# Patient Record
Sex: FEMALE | Race: WHITE | NOT HISPANIC OR LATINO | Employment: OTHER | URBAN - METROPOLITAN AREA
[De-identification: names, ages, dates, MRNs, and addresses within clinical notes are randomized per-mention and may not be internally consistent; named-entity substitution may affect disease eponyms.]

---

## 2017-04-03 ENCOUNTER — APPOINTMENT (OUTPATIENT)
Dept: LAB | Facility: HOSPITAL | Age: 57
End: 2017-04-03
Attending: OBSTETRICS & GYNECOLOGY
Payer: MEDICARE

## 2017-04-03 DIAGNOSIS — R10.9 ABDOMINAL PAIN: ICD-10-CM

## 2017-04-03 DIAGNOSIS — C55 MALIGNANT NEOPLASM OF UTERUS (HCC): ICD-10-CM

## 2017-04-03 DIAGNOSIS — C56.9 MALIGNANT NEOPLASM OF OVARY (HCC): ICD-10-CM

## 2017-04-03 DIAGNOSIS — R31.0 GROSS HEMATURIA: ICD-10-CM

## 2017-04-03 PROCEDURE — 86304 IMMUNOASSAY TUMOR CA 125: CPT

## 2017-04-03 PROCEDURE — 36415 COLL VENOUS BLD VENIPUNCTURE: CPT

## 2017-04-04 LAB — CANCER AG125 SERPL-ACNC: 5.7 U/ML (ref 0–30)

## 2017-04-25 ENCOUNTER — ALLSCRIPTS OFFICE VISIT (OUTPATIENT)
Dept: OTHER | Facility: OTHER | Age: 57
End: 2017-04-25

## 2017-04-25 DIAGNOSIS — N20.0 CALCULUS OF KIDNEY: ICD-10-CM

## 2017-04-25 DIAGNOSIS — C56.9 MALIGNANT NEOPLASM OF OVARY (HCC): ICD-10-CM

## 2017-04-25 DIAGNOSIS — C55 MALIGNANT NEOPLASM OF UTERUS (HCC): ICD-10-CM

## 2017-04-25 DIAGNOSIS — R31.0 GROSS HEMATURIA: ICD-10-CM

## 2017-10-09 ENCOUNTER — APPOINTMENT (OUTPATIENT)
Dept: LAB | Facility: HOSPITAL | Age: 57
End: 2017-10-09
Attending: OBSTETRICS & GYNECOLOGY
Payer: MEDICARE

## 2017-10-09 DIAGNOSIS — C56.9 MALIGNANT NEOPLASM OF OVARY (HCC): ICD-10-CM

## 2017-10-09 DIAGNOSIS — N20.0 CALCULUS OF KIDNEY: ICD-10-CM

## 2017-10-09 DIAGNOSIS — C55 MALIGNANT NEOPLASM OF UTERUS (HCC): ICD-10-CM

## 2017-10-09 DIAGNOSIS — R31.0 GROSS HEMATURIA: ICD-10-CM

## 2017-10-09 PROCEDURE — 36415 COLL VENOUS BLD VENIPUNCTURE: CPT

## 2017-10-09 PROCEDURE — 86304 IMMUNOASSAY TUMOR CA 125: CPT

## 2017-10-10 LAB — CANCER AG125 SERPL-ACNC: 5.3 U/ML (ref 0–30)

## 2017-10-24 ENCOUNTER — ALLSCRIPTS OFFICE VISIT (OUTPATIENT)
Dept: OTHER | Facility: OTHER | Age: 57
End: 2017-10-24

## 2017-10-24 DIAGNOSIS — C55 MALIGNANT NEOPLASM OF UTERUS (HCC): ICD-10-CM

## 2017-10-24 DIAGNOSIS — C56.9 MALIGNANT NEOPLASM OF OVARY (HCC): ICD-10-CM

## 2017-10-25 NOTE — PROGRESS NOTES
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)   2  Ovary cancer (183 0) (C56 9)    Plan  Endometrioid adenocarcinoma of uterus, Ovary cancer    · Follow-up visit in 6 months Evaluation and Treatment  Follow-up  Status: Hold For -  Scheduling  Requested for: 24Oct2017   Ordered; For: Endometrioid adenocarcinoma of uterus, Ovary cancer; Ordered By: Eunice Gale Performed:  Due: 29Oct2017   · (1) ; Status:Active; Requested [facility]:[id number];    Perform:[facility]ly [facility]; UYL:[id number]; Ordered;For:Endometrioid adenocarcinoma of uterus, Ovary cancer; Ordered By:Kaye Berry;    Discussion/Summary  Discussion Summary:   Early ovarian and endometrial cancer: No evidence of disease  Plan to reevaluate in 6 months with a previsit CA-125  Genetic susceptibility to cancer: COLARIS and BRCA wild type  Incidental lung nodule: Stable on interval followup  Despite new tiny lesions (all < 5 mm), patient is in an exceedingly low risk of metastatic lung disease from early ovarian and endometrial cancer  We'll only followup on an as-needed basis  Incidental liver lesion: MRI of the liver favored [facility], repeat MRI December 2014 confirmed likely [address]  Abdominal pain: chronic  Per PCP and GI  Kidney stones: Per urology at [facility]  Chief Complaint  Chief Complaint Free Text Note Form: Endometrial and ovarian cancer surveillance  History of Present Illness  Problem [facility]:   #1  Dual primaries: Stage IA grade 2 (non invasive, no LVSI) endometrial cancer + stage IA grade 2 endometrioid ovarian cancer  Pathology review from [facility]  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: COLARIS negative for mutations  BRCA1/2 wild type  Incidental lung nodule: Undergoing CT surveillance  Liver lesion: Confirmed [facility]  Incisional hernia (repaired with mesh Dr Adin Alvarez 2014)  Admission for / medical therapy for diverticulitis    Kidney stones, s/p endourologic procedure, now with stent in place (  [address])  Admission to [facility] July 2015 for severe abdominal pain  Reportedly, patient was told by her primary care physician Dr Chiquis Reis that her abdominal wall mesh needed to be removed (?)  Previous Therapy:   #1  EUA, JIA, BSO, Pelvic and Periaortic LND, Omentectomy, Peritoneal Bxs, Appy on 9/20/2013  Chemotherapy with carboplatin and paclitaxel every 3 weeks  She received 6 cycles of chemotherapy, completed January 31, 2014  Free Text HPI: Here for routine surveillance  Has no gynecologic complaints  Denies pelvic or abdominal pain other than occasional twinges and colicky discomfort  Denies vaginal bleeding, drainage or discharge  Constipation is stable at baseline  CA-125 stable  Review of Systems  Complete-Female Gyn Onc:   Constitutional: No fever, no recent weight gain, no chills, not feeling tired and no recent weight loss  Gastrointestinal: abdominal pain-- and-- diarrhea, but-- as noted in HPI  Genitourinary: No complaints of dysuria, no incontinence, no pelvic pain, no dysmenorrhea, no vaginal discharge or bleeding  Active Problems  1  Abdominal pain (789 00) (R10 9)   2  Anxiety (300 00) (F41 9)   3  Arthritis (716 90) (M19 90)   4  Backache (724 5) (M54 9)   5  Breast cancer screening, high risk patient (V76 11) (Z12 31)   6  Chronic obstructive pulmonary disease (496) (J44 9)   7  Depression (311) (F32 9)   8  Diverticular disease (562 10) (K57 90)   9  Encounter for antineoplastic chemotherapy (V58 11) (Z51 11)   10  Encounter for screening mammogram for malignant neoplasm of breast (V76 12)    (Z12 31)   11  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)   12  Gross hematuria (599 71) (R31 0)   13  Kidney stones (592 0) (N20 0)   14  Ovary cancer (183 0) (C56 9)    Past Medical History  1  Arthritis (716 90) (M19 90)   2  Backache (724 5) (M54 9)   3  History of Bad odor of urine (791 9) (R82 90)   4   History of Cellulitis Of The Abdominal Wall (682 2)   5  History of Chemotherapeutics   6  Chronic obstructive pulmonary disease (496) (J44 9)   7  Depression (311) (F32 9)   8  History of Disruption Of Wound (998 30)   9  History of Genetic Susceptibility To Malignant Neoplasm Endometrium (V84 04)   10  History of cancer of uterus (V10 42) (Z85 42)   11  History of nausea and vomiting (V12 79) (Z87 898)   12  History of Hyperplastic colon polyp (211 3) (K63 5)   13  History of Incisional hernia (553 21) (K43 2)   14  History of Liver lesion (573 8) (K76 9)   15  History of Postoperative examination (V67 00) (Z09)   16  History of Pulmonary nodule seen on imaging study (793 11) (R91 1)   17  History of Status post repair of ventral hernia (V45 89) (Z98 890,Z87 19)   18  History of Visit For: Exam Following Chemotherapy (V67 2)  Active Problems And Past Medical History Reviewed: The active problems and past medical history were reviewed and updated today  Surgical History  1  History of Appendectomy   2  History of Hysterectomy   3  History of Incisional Hernia Repair   4  History of Laparoscopy (Diagnostic)  Surgical History Reviewed: The surgical history was reviewed and updated today  Family History  Father    1  Family history of Colon Cancer (V16 0)  Paternal Grandfather    2  Family history of Malignant Neoplasm Of Body Of Pancreas  Maternal Aunt    3  Family history of Breast Cancer (V16 3)  Family History Reviewed: The family history was reviewed and updated today  Social History   · Denied: History of Alcohol Use (History)   · Current Every Day Smoker (305 1)   · Denied: History of Drug Use  Social History Reviewed: The social history was reviewed and updated today  Current Meds   1  Dicyclomine HCl - 20 MG Oral Tablet; 1 tablet 3 times daily; Therapy: (Recorded:08Mar2016) to Recorded   2  LORazepam 1 MG Oral Tablet; 1 tablet 4 times daily; Therapy: (Recorded:24Oct2017) to Recorded   3  Naproxen 500 MG Oral Tablet;  Take 1 tablet twice daily as needed Recorded   4  Paxil 10 MG Oral Tablet; Therapy: (LVKOYJVU:[id number]) to Recorded  Medication List Reviewed: The medication list was reviewed and updated today  Allergies  1  No Known Drug Allergies  2  Apples   3  Banana   4  Other   5  Peanuts    Vitals  Vital Signs    Recorded: [id number] 10:49AM   Temperature 98 1 F, Oral   Heart Rate 60, R Radial   Pulse Quality Normal, R Radial   Respiration Quality Normal   Respiration 16   Systolic 973, RUE, Sitting   Diastolic 80, RUE, Sitting   Height 5 ft 10 in   Weight 193 lb    BMI Calculated 27 69   BSA Calculated 2 06     Physical Exam    Constitutional   General appearance: No acute distress, well appearing and well nourished  Neck   Neck: Normal, supple, trachea midline, no masses  Thyroid: Normal, no thyromegaly  Pulmonary   Respiratory effort: No increased work of breathing or signs of respiratory distress  Auscultation of lungs: Clear to auscultation  Cardiovascular   Auscultation of heart: Normal rate and rhythm, normal S1 and S2, no murmurs  Peripheral vascular exam: Normal pulses throughout  No edema noted in lower extremities  Genitourinary   External genitalia: Normal and no lesions appreciated  Vagina: Normal, no lesions or dryness appreciated  Urethra: Normal     Urethral meatus: Normal     Bladder: Normal, soft, non-tender and no prolapse or masses appreciated  Cervix: Surgically absent  Uterus: Surgically absent  Adnexa/parametria: Surgically absent  Abdomen   Abdomen: Normal, soft, non-tender, and no organomegaly noted  Examination for hernias: No hernias appreciated      Psychiatric   Orientation to person, place, and time: Normal     Mood and affect: Normal     GOG performance status is: 0      Results/Data  (1)  [id number] 02:50PM Pedro Lopez Order Number: [id number]     Test Name Result Flag Reference   (NEW) 5 3 U/mL  0 0-30 0   [facility]  Results cannot be interpreted as absolute evidence for the presence or the absence of malignant disease  Duplicate testing  This test should be used to re-establish your patient's baseline       Signatures   Electronically signed by : Denton Cheatham MD; Oct 24 2017 11:11AM EST                       (Author)

## 2018-01-13 VITALS
DIASTOLIC BLOOD PRESSURE: 72 MMHG | BODY MASS INDEX: 26.48 KG/M2 | WEIGHT: 185 LBS | HEIGHT: 70 IN | SYSTOLIC BLOOD PRESSURE: 110 MMHG | HEART RATE: 72 BPM | RESPIRATION RATE: 16 BRPM | TEMPERATURE: 97.7 F

## 2018-01-14 VITALS
HEIGHT: 70 IN | SYSTOLIC BLOOD PRESSURE: 114 MMHG | WEIGHT: 193 LBS | BODY MASS INDEX: 27.63 KG/M2 | RESPIRATION RATE: 16 BRPM | DIASTOLIC BLOOD PRESSURE: 80 MMHG | HEART RATE: 60 BPM | TEMPERATURE: 98.1 F

## 2018-04-12 ENCOUNTER — APPOINTMENT (OUTPATIENT)
Dept: LAB | Facility: HOSPITAL | Age: 58
End: 2018-04-12
Attending: OBSTETRICS & GYNECOLOGY
Payer: MEDICARE

## 2018-04-12 DIAGNOSIS — C55 MALIGNANT NEOPLASM OF UTERUS (HCC): ICD-10-CM

## 2018-04-12 DIAGNOSIS — C56.9 MALIGNANT NEOPLASM OF OVARY (HCC): ICD-10-CM

## 2018-04-12 LAB — CANCER AG125 SERPL-ACNC: 4 U/ML (ref 0–30)

## 2018-04-12 PROCEDURE — 86304 IMMUNOASSAY TUMOR CA 125: CPT

## 2018-04-12 PROCEDURE — 36415 COLL VENOUS BLD VENIPUNCTURE: CPT

## 2018-04-23 PROBLEM — C56.9 OVARIAN CANCER (HCC): Status: ACTIVE | Noted: 2018-04-23

## 2018-04-23 PROBLEM — C54.1 ENDOMETRIAL CANCER (HCC): Status: ACTIVE | Noted: 2018-04-23

## 2018-04-24 ENCOUNTER — OFFICE VISIT (OUTPATIENT)
Dept: GYNECOLOGIC ONCOLOGY | Facility: CLINIC | Age: 58
End: 2018-04-24
Payer: MEDICARE

## 2018-04-24 VITALS
DIASTOLIC BLOOD PRESSURE: 78 MMHG | RESPIRATION RATE: 20 BRPM | HEART RATE: 80 BPM | WEIGHT: 196 LBS | BODY MASS INDEX: 28.06 KG/M2 | SYSTOLIC BLOOD PRESSURE: 138 MMHG | TEMPERATURE: 98.1 F | HEIGHT: 70 IN

## 2018-04-24 DIAGNOSIS — C56.9 MALIGNANT NEOPLASM OF OVARY, UNSPECIFIED LATERALITY (HCC): Primary | ICD-10-CM

## 2018-04-24 DIAGNOSIS — Z12.11 SCREENING FOR COLON CANCER: ICD-10-CM

## 2018-04-24 DIAGNOSIS — C54.1 ENDOMETRIAL CANCER (HCC): ICD-10-CM

## 2018-04-24 DIAGNOSIS — Z12.39 BREAST CANCER SCREENING: ICD-10-CM

## 2018-04-24 PROCEDURE — 99213 OFFICE O/P EST LOW 20 MIN: CPT | Performed by: OBSTETRICS & GYNECOLOGY

## 2018-04-24 RX ORDER — EPINEPHRINE 0.3 MG/.3ML
INJECTION SUBCUTANEOUS
COMMUNITY
Start: 2012-03-29

## 2018-04-24 RX ORDER — PAROXETINE 10 MG/1
TABLET, FILM COATED ORAL
COMMUNITY
End: 2018-04-24 | Stop reason: SDUPTHER

## 2018-04-24 RX ORDER — LORAZEPAM 1 MG/1
TABLET ORAL
Refills: 2 | COMMUNITY
Start: 2018-04-03

## 2018-04-24 RX ORDER — ALBUTEROL SULFATE 90 UG/1
1-2 AEROSOL, METERED RESPIRATORY (INHALATION) EVERY 6 HOURS
COMMUNITY
Start: 2017-06-26 | End: 2018-06-26

## 2018-04-24 RX ORDER — NAPROXEN 500 MG/1
500 TABLET ORAL
COMMUNITY
Start: 2017-01-20

## 2018-04-24 RX ORDER — PAROXETINE HYDROCHLORIDE 20 MG/1
20 TABLET, FILM COATED ORAL
COMMUNITY
Start: 2018-02-15

## 2018-04-24 RX ORDER — DICYCLOMINE HCL 20 MG
20 TABLET ORAL EVERY 6 HOURS
COMMUNITY
Start: 2016-08-22 | End: 2018-10-23

## 2018-04-24 NOTE — ASSESSMENT & PLAN NOTE
-   Patient has history of synchronous ovarian and endometrial cancers  Workup for Rogers syndrome and BRCA reveal no deleterious mutations  She has remained with no evidence of disease   remains low  I plan to see her back in 6 months with a pre visit   She will contact us sooner if she develops any symptoms

## 2018-04-24 NOTE — PROGRESS NOTES
Assessment/Plan:    Problem List Items Addressed This Visit        Genitourinary    Endometrial cancer ([facility] [address] )    Ovarian cancer ([facility] [address] ) - Primary     -   Patient has history of synchronous ovarian and endometrial cancers  Workup for Rogers syndrome and BRCA reveal no deleterious mutations  She has remained with no evidence of disease   remains low  I plan to see her back in 6 months with a pre visit   She will contact us sooner if she develops any symptoms  Relevant Orders           Other    Screening for colon cancer     -   Patient will be referred back to Dr Ashleigh Iyer  For follow-up screening colonoscopy  Relevant Orders    Ambulatory referral to Colorectal Surgery    Breast cancer screening     -   Referral for mammogram given  Relevant Orders    Mammo screening bilateral w cad            CHIEF COMPLAINT:       Here for surveillance, history of synchronous breast and endometrial cancers  Previous therapy:     Ovarian cancer ([facility] [address] )    9/20/2013 Surgery     EUA, JIA, BSO, Pelvic and Periaortic LND, Omentectomy, Peritoneal Bxs, Appy on 9/20/2013  Dual primaries: Stage IA grade 2 (non invasive, no LVSI) endometrial cancer + stage IA grade 2 endometrioid ovarian cancer  Pathology review from [facility] agrees  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: COLARIS negative for mutations  BRCA1/2 wild type  - 1/31/2014 Chemotherapy     Chemotherapy with carboplatin and paclitaxel every 3 weeks  She received 6 cycles of chemotherapy, completed January 31, 2014  Patient ID: Darylene Cure is a 63 y o  female  HPI    Patient presents for routine surveillance visit  Denies vaginal bleeding, drainage or discharge  Occasional constipation  Persistent intermittent abdominal pains this have been thoroughly evaluated in the past without obvious cause  She thinks she is due for colonoscopy at this time  Overdue for mammogram   Normal bladder function  No other new symptoms    CA 125 prior to today's visit was unremarkable  The following portions of the patient's history were reviewed and updated as appropriate: allergies, current medications, past family history, past medical history, past social history, past surgical history and problem list     Review of Systems    As above, otherwise 12 point review of systems is unremarkable  Current Outpatient Prescriptions   Medication Sig Dispense Refill    albuterol (PROVENTIL HFA,VENTOLIN HFA) 90 mcg/act inhaler Inhale 1-2 puffs every 6 (six) hours      dicyclomine (BENTYL) 20 mg tablet Take 20 mg by mouth every 6 (six) hours      EPINEPHrine (EPIPEN) 0 3 mg/0 3 mL SOAJ Reported on 2/17/2017       LORazepam (ATIVAN) 1 mg tablet   2    naproxen (NAPROSYN) 500 mg tablet Take 500 mg by mouth      PARoxetine (PAXIL) 20 mg tablet Take 20 mg by mouth       No current facility-administered medications for this visit  Objective:    Blood pressure 138/78, pulse 80, temperature 98 1 °F (36 7 °C), temperature source Oral, resp  rate 20, height 5' 10" (1 778 m), weight 88 9 kg (196 lb), not currently breastfeeding  Body mass index is 28 12 kg/m²  Body surface area is 2 07 meters squared  Physical Exam   Constitutional: She is oriented to person, place, and time  She appears well-developed and well-nourished  HENT:   Head: Normocephalic and atraumatic  Neck: Normal range of motion  Neck supple  No thyromegaly present  Cardiovascular: Normal rate and regular rhythm  No murmur heard  Pulmonary/Chest: Effort normal and breath sounds normal  She has no wheezes  Abdominal: Soft  She exhibits no distension and no mass  There is no rebound  Genitourinary:   Genitourinary Comments:   Normal external genitalia  Vulvovaginal atrophy  No lesions  No blood  Uterus, cervix and bilateral tubes and ovaries are surgically absent  Musculoskeletal: Normal range of motion  She exhibits no edema     Lymphadenopathy:     She has no cervical adenopathy  Neurological: She is alert and oriented to person, place, and time  Skin: Skin is warm and dry  No rash noted  Psychiatric: She has a normal mood and affect  Her behavior is normal    Vitals reviewed        Lab Results   Component Value Date     4 0 04/12/2018       Neil Bernal MD, [place], [facility]  4/24/2018  11:15 AM

## 2018-05-01 ENCOUNTER — HOSPITAL ENCOUNTER (OUTPATIENT)
Dept: MAMMOGRAPHY | Facility: HOSPITAL | Age: 58
Discharge: HOME | End: 2018-05-01
Attending: OBSTETRICS & GYNECOLOGY
Payer: MEDICARE

## 2018-05-01 DIAGNOSIS — Z12.39 BREAST CANCER SCREENING: ICD-10-CM

## 2018-05-01 PROCEDURE — 77067 SCR MAMMO BI INCL CAD: CPT

## 2018-10-16 ENCOUNTER — APPOINTMENT (OUTPATIENT)
Dept: LAB | Facility: HOSPITAL | Age: 58
End: 2018-10-16
Attending: OBSTETRICS & GYNECOLOGY
Payer: MEDICARE

## 2018-10-16 DIAGNOSIS — C56.9 MALIGNANT NEOPLASM OF OVARY, UNSPECIFIED LATERALITY (HCC): ICD-10-CM

## 2018-10-16 LAB — CANCER AG125 SERPL-ACNC: 6.2 U/ML (ref 0–30)

## 2018-10-16 PROCEDURE — 86304 IMMUNOASSAY TUMOR CA 125: CPT

## 2018-10-16 PROCEDURE — 36415 COLL VENOUS BLD VENIPUNCTURE: CPT

## 2018-10-23 ENCOUNTER — OFFICE VISIT (OUTPATIENT)
Dept: GYNECOLOGIC ONCOLOGY | Facility: CLINIC | Age: 58
End: 2018-10-23
Payer: MEDICARE

## 2018-10-23 VITALS
DIASTOLIC BLOOD PRESSURE: 62 MMHG | SYSTOLIC BLOOD PRESSURE: 106 MMHG | WEIGHT: 187 LBS | HEIGHT: 70 IN | HEART RATE: 82 BPM | RESPIRATION RATE: 16 BRPM | BODY MASS INDEX: 26.77 KG/M2

## 2018-10-23 DIAGNOSIS — C56.9 MALIGNANT NEOPLASM OF OVARY, UNSPECIFIED LATERALITY (HCC): ICD-10-CM

## 2018-10-23 DIAGNOSIS — C54.1 ENDOMETRIAL CANCER (HCC): Primary | ICD-10-CM

## 2018-10-23 PROCEDURE — 99212 OFFICE O/P EST SF 10 MIN: CPT | Performed by: OBSTETRICS & GYNECOLOGY

## 2018-10-23 NOTE — PROGRESS NOTES
Assessment/Plan:    Problem List Items Addressed This Visit        Genitourinary    Endometrial cancer ([facility] [address] ) - Primary    Relevant Orders        Ovarian cancer ([facility] [address] )     -   She is more than 4 years out from completion of treatment  She remains with no evidence of disease  I plan to see her back in 6 months with pre visit   Relevant Orders                CHIEF COMPLAINT:      surveillance for remote history of synchronous ovarian / endometrial cancers  Previous therapy:     Ovarian cancer ([facility] [address] )    9/20/2013 Surgery     EUA, JIA, BSO, Pelvic and Periaortic LND, Omentectomy, Peritoneal Bxs, Appy on 9/20/2013  Dual primaries: Stage IA grade 2 (non invasive, no LVSI) endometrial cancer + stage IA grade 2 endometrioid ovarian cancer  Pathology review from [facility] agrees  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: COLARIS negative for mutations  BRCA1/2 wild type  - 1/31/2014 Chemotherapy     Chemotherapy with carboplatin and paclitaxel every 3 weeks  She received 6 cycles of chemotherapy, completed January 31, 2014  Patient ID: Karla Richards is a 63 y o  female  HPI    Patient is now approaching 5 years after completion of treatment for synchronous stage I ovarian and stage I endometrial cancers  She was evaluated for genetic predisposition syndromes with no evidence of pathogenic mutations  She has remained with no evidence of disease after completion of treatment  Continues to have regular colonoscopies and screening mammograms  Presents for follow-up  She has chronic abdominal pain which has been thoroughly evaluated and found to be idiopathic    prior to today's visit is unremarkable  Reports normal bowel and bladder function  Denies vaginal bleeding, drainage or discharge    The following portions of the patient's history were reviewed and updated as appropriate: allergies, current medications, past family history, past medical history, past social history, past surgical history and problem list     Review of Systems    As above  Twelve point review of systems is otherwise unremarkable  Current Outpatient Prescriptions   Medication Sig Dispense Refill    EPINEPHrine (EPIPEN) 0 3 mg/0 3 mL SOAJ Reported on 2/17/2017       LORazepam (ATIVAN) 1 mg tablet   2    naproxen (NAPROSYN) 500 mg tablet Take 500 mg by mouth      PARoxetine (PAXIL) 20 mg tablet Take 20 mg by mouth       No current facility-administered medications for this visit  Objective:    Blood pressure 106/62, pulse 82, resp  rate 16, height 5' 10" (1 778 m), weight 84 8 kg (187 lb), not currently breastfeeding  Body mass index is 26 83 kg/m²  Body surface area is 2 03 meters squared  Physical Exam   Constitutional: She is oriented to person, place, and time  She appears well-developed and well-nourished  HENT:   Head: Normocephalic and atraumatic  Neck: Normal range of motion  Neck supple  No thyromegaly present  Cardiovascular: Normal rate and regular rhythm  No murmur heard  Pulmonary/Chest: Effort normal and breath sounds normal  No respiratory distress  She has no rales  Abdominal: Soft  She exhibits no distension and no mass  There is no rebound  Genitourinary:   Genitourinary Comments: The external female genitalia is normal  The bartholin's, uretheral and skenes glands are normal  The urethral meatus is normal (midline with no lesions)  Anus without fissure or lesion  Speculum exam reveals a grossly normal vagina  No masses, lesions, discharge or bleeding  No significant cystocele or rectocele noted  Bimanual exam notes a surgical absent cervix, uterus and adnexal structures  No masses or fullness  Bladder is without fullness, mass or tenderness  Musculoskeletal: Normal range of motion  She exhibits no edema  Lymphadenopathy:     She has no cervical adenopathy  Neurological: She is alert and oriented to person, place, and time  Skin: Skin is warm and dry  No rash noted  Psychiatric: She has a normal mood and affect  Her behavior is normal    Vitals reviewed        Lab Results   Component Value Date     6 2 10/16/2018

## 2018-10-23 NOTE — ASSESSMENT & PLAN NOTE
-   She is more than 4 years out from completion of treatment  She remains with no evidence of disease  I plan to see her back in 6 months with pre visit

## 2019-04-10 ENCOUNTER — APPOINTMENT (OUTPATIENT)
Dept: LAB | Facility: HOSPITAL | Age: 59
End: 2019-04-10
Attending: OBSTETRICS & GYNECOLOGY
Payer: MEDICARE

## 2019-04-10 DIAGNOSIS — C56.9 MALIGNANT NEOPLASM OF OVARY, UNSPECIFIED LATERALITY (HCC): ICD-10-CM

## 2019-04-10 DIAGNOSIS — C54.1 ENDOMETRIAL CANCER (HCC): ICD-10-CM

## 2019-04-10 LAB — CANCER AG125 SERPL-ACNC: 6.9 U/ML (ref 0–30)

## 2019-04-10 PROCEDURE — 86304 IMMUNOASSAY TUMOR CA 125: CPT

## 2019-04-10 PROCEDURE — 36415 COLL VENOUS BLD VENIPUNCTURE: CPT

## 2019-04-23 ENCOUNTER — OFFICE VISIT (OUTPATIENT)
Dept: GYNECOLOGIC ONCOLOGY | Facility: CLINIC | Age: 59
End: 2019-04-23
Payer: MEDICARE

## 2019-04-23 VITALS
HEART RATE: 70 BPM | HEIGHT: 70 IN | WEIGHT: 196 LBS | DIASTOLIC BLOOD PRESSURE: 68 MMHG | BODY MASS INDEX: 28.06 KG/M2 | SYSTOLIC BLOOD PRESSURE: 102 MMHG

## 2019-04-23 DIAGNOSIS — Z08 ENCOUNTER FOR FOLLOW-UP SURVEILLANCE OF OVARIAN CANCER: ICD-10-CM

## 2019-04-23 DIAGNOSIS — Z85.43 ENCOUNTER FOR FOLLOW-UP SURVEILLANCE OF OVARIAN CANCER: ICD-10-CM

## 2019-04-23 DIAGNOSIS — Z85.42 ENCOUNTER FOR FOLLOW-UP SURVEILLANCE OF ENDOMETRIAL CANCER: Primary | ICD-10-CM

## 2019-04-23 DIAGNOSIS — Z08 ENCOUNTER FOR FOLLOW-UP SURVEILLANCE OF ENDOMETRIAL CANCER: Primary | ICD-10-CM

## 2019-04-23 PROCEDURE — 99212 OFFICE O/P EST SF 10 MIN: CPT | Performed by: OBSTETRICS & GYNECOLOGY

## 2019-04-23 RX ORDER — AMITRIPTYLINE HYDROCHLORIDE 10 MG/1
10 TABLET, FILM COATED ORAL
COMMUNITY
Start: 2019-04-17

## 2019-06-28 ENCOUNTER — TRANSCRIBE ORDERS (OUTPATIENT)
Dept: ADMINISTRATIVE | Facility: HOSPITAL | Age: 59
End: 2019-06-28
